# Patient Record
Sex: FEMALE | Race: WHITE | NOT HISPANIC OR LATINO | URBAN - NONMETROPOLITAN AREA
[De-identification: names, ages, dates, MRNs, and addresses within clinical notes are randomized per-mention and may not be internally consistent; named-entity substitution may affect disease eponyms.]

---

## 2020-02-26 ENCOUNTER — APPOINTMENT (RX ONLY)
Dept: URBAN - NONMETROPOLITAN AREA CLINIC 13 | Facility: CLINIC | Age: 40
Setting detail: DERMATOLOGY
End: 2020-02-26

## 2020-02-26 DIAGNOSIS — L56.4 POLYMORPHOUS LIGHT ERUPTION: ICD-10-CM | Status: INADEQUATELY CONTROLLED

## 2020-02-26 PROBLEM — L30.9 DERMATITIS, UNSPECIFIED: Status: ACTIVE | Noted: 2020-02-26

## 2020-02-26 PROCEDURE — ? SEPARATE AND IDENTIFIABLE DOCUMENTATION

## 2020-02-26 PROCEDURE — ? ADDITIONAL NOTES

## 2020-02-26 PROCEDURE — ? BIOPSY BY PUNCH METHOD

## 2020-02-26 PROCEDURE — ? PRESCRIPTION

## 2020-02-26 PROCEDURE — 11104 PUNCH BX SKIN SINGLE LESION: CPT

## 2020-02-26 PROCEDURE — 99213 OFFICE O/P EST LOW 20 MIN: CPT | Mod: 25

## 2020-02-26 ASSESSMENT — LOCATION DETAILED DESCRIPTION DERM
LOCATION DETAILED: MIDDLE STERNUM
LOCATION DETAILED: LEFT CENTRAL LATERAL NECK
LOCATION DETAILED: LEFT SUPERIOR CENTRAL MALAR CHEEK

## 2020-02-26 ASSESSMENT — LOCATION ZONE DERM
LOCATION ZONE: FACE
LOCATION ZONE: TRUNK
LOCATION ZONE: NECK

## 2020-02-26 ASSESSMENT — LOCATION SIMPLE DESCRIPTION DERM
LOCATION SIMPLE: CHEST
LOCATION SIMPLE: NECK
LOCATION SIMPLE: LEFT CHEEK

## 2020-02-26 NOTE — HPI: RASH
Is This A New Presentation, Or A Follow-Up?: Rash
Additional History: She reports no new medications, on Enbrel x 3 years for psoriatic arthritis

## 2020-02-26 NOTE — PROCEDURE: BIOPSY BY PUNCH METHOD
Detail Level: Detailed
Was A Bandage Applied: Yes
Punch Size In Mm: 3
Biopsy Type: H and E
Anesthesia Type: 1% lidocaine with epinephrine
Anesthesia Volume In Cc: 0.5
Additional Anesthesia Volume In Cc (Will Not Render If 0): 0
Hemostasis: None
Epidermal Sutures: 5-0 Fast Absorbing Gut
Wound Care: Petrolatum
Dressing: bandage
Patient Will Remove Sutures At Home?: No
Lab: [id number]
Path Notes (To The Dermatopathologist): Dr. Cervantes
Consent: Written consent was obtained and risks were reviewed including but not limited to scarring, infection, bleeding, scabbing, incomplete removal, nerve damage and allergy to anesthesia.
Post-Care Instructions: I reviewed with the patient in detail post-care instructions. Patient is to keep the biopsy site dry overnight, and then apply bacitracin twice daily until healed. Patient may apply hydrogen peroxide soaks to remove any crusting.
Home Suture Removal Text: Patient was provided a home suture removal kit and will remove their sutures at home.  If they have any questions or difficulties they will call the office.
Notification Instructions: Patient will be notified of biopsy results. However, patient instructed to call the office if not contacted within 2 weeks.
Billing Type: Third-Party Bill

## 2020-02-26 NOTE — PROCEDURE: ADDITIONAL NOTES
Detail Level: Simple
Additional Notes: -start prednisone 30mg QAM x 3 days,20mg QAM x 3 days,10mg QAM x 3 days\\n-follow up in 2 weeks\\n-aggressive sun protection reviewed

## 2020-04-27 ENCOUNTER — APPOINTMENT (RX ONLY)
Dept: URBAN - NONMETROPOLITAN AREA CLINIC 13 | Facility: CLINIC | Age: 40
Setting detail: DERMATOLOGY
End: 2020-04-27

## 2020-04-27 DIAGNOSIS — L30.9 DERMATITIS, UNSPECIFIED: ICD-10-CM | Status: INADEQUATELY CONTROLLED

## 2020-04-27 PROCEDURE — ? PATIENT SPECIFIC COUNSELING

## 2020-04-27 PROCEDURE — 99213 OFFICE O/P EST LOW 20 MIN: CPT

## 2020-04-27 PROCEDURE — ? PRESCRIPTION

## 2020-04-27 ASSESSMENT — LOCATION SIMPLE DESCRIPTION DERM: LOCATION SIMPLE: LEFT CHEEK

## 2020-04-27 ASSESSMENT — LOCATION ZONE DERM: LOCATION ZONE: FACE

## 2020-04-27 ASSESSMENT — LOCATION DETAILED DESCRIPTION DERM: LOCATION DETAILED: LEFT SUPERIOR LATERAL BUCCAL CHEEK

## 2020-04-27 NOTE — PROCEDURE: PATIENT SPECIFIC COUNSELING
Detail Level: Zone
Papular photodistributed eruption. DDx includes PMLE, eosinophilic folliculitis, solar or other urticaria, DHR (arthropod, unlikely), drug eruption (Enbrel, less likely given distribution and delayed onset). Biopsy without features of cutaneous lupus. \\n\\nPlan:\\n\\n-very aggressive sun protection measures discussed, recommended physical blockers and sun protective hats and clothing, sun avoidance\\n-recommended Allegra 180mg QAM and Zyrtec 10mg QPM, and Benadryl 25mg at bedtime. Can increase Allegra 180mg to 2 QAM and Zyrtec 10mg to 2 QPM if needed\\n-discussed prednisone taper 30mg QAM x 4 days, 20mg QAM x 4 days, 10mg QAM x 4 days \\n-if not well controlled, consider systemic medications, would repeat biopsy prior to starting oral medication\\n-pt is on Enbrel (x 4 years) for PsA, if refractory given other measures, consider stopping this x 2 months, would need to coordinate with Rheumatology. \\n-follow up in 1 month, call sooner if worsens

## 2020-06-15 ENCOUNTER — APPOINTMENT (RX ONLY)
Dept: URBAN - NONMETROPOLITAN AREA CLINIC 13 | Facility: CLINIC | Age: 40
Setting detail: DERMATOLOGY
End: 2020-06-15

## 2020-06-15 VITALS — WEIGHT: 165 LBS | HEIGHT: 70 IN

## 2020-06-15 DIAGNOSIS — L30.8 OTHER SPECIFIED DERMATITIS: ICD-10-CM

## 2020-06-15 PROCEDURE — ? PATIENT SPECIFIC COUNSELING

## 2020-06-15 PROCEDURE — ? COUNSELING

## 2020-06-15 PROCEDURE — 99213 OFFICE O/P EST LOW 20 MIN: CPT

## 2020-06-15 NOTE — PROCEDURE: PATIENT SPECIFIC COUNSELING
Detail Level: Zone
Plan:\\n\\n- Very aggressive sun protection measures discussed, recommended physical blockers and sunscreen\\n- Recommended taking 2 Allegra tablets daily in AM and 2 Zyrtec tablets daily in PM  \\n- Discussed adding doxepin for stronger antihistamine if rash fails to resolve with OTC’s\\n- Recommended discussing trial of discontinuing Enbrel with rheumatologist to rule-out drug eruption\\n- Patient will call to report improvement/status after increasing antihistamines

## 2020-09-21 ENCOUNTER — APPOINTMENT (RX ONLY)
Dept: URBAN - NONMETROPOLITAN AREA CLINIC 13 | Facility: CLINIC | Age: 40
Setting detail: DERMATOLOGY
End: 2020-09-21

## 2020-09-21 DIAGNOSIS — L56.4 POLYMORPHOUS LIGHT ERUPTION: ICD-10-CM

## 2020-09-21 PROBLEM — L30.9 DERMATITIS, UNSPECIFIED: Status: ACTIVE | Noted: 2020-09-21

## 2020-09-21 PROCEDURE — ? PATIENT SPECIFIC COUNSELING

## 2020-09-21 PROCEDURE — 99212 OFFICE O/P EST SF 10 MIN: CPT | Mod: 25

## 2020-09-21 PROCEDURE — 11104 PUNCH BX SKIN SINGLE LESION: CPT

## 2020-09-21 PROCEDURE — ? BIOPSY BY PUNCH METHOD

## 2020-09-21 PROCEDURE — ? SEPARATE AND IDENTIFIABLE DOCUMENTATION

## 2020-09-21 ASSESSMENT — LOCATION DETAILED DESCRIPTION DERM: LOCATION DETAILED: LEFT CENTRAL LATERAL NECK

## 2020-09-21 ASSESSMENT — LOCATION SIMPLE DESCRIPTION DERM: LOCATION SIMPLE: NECK

## 2020-09-21 ASSESSMENT — LOCATION ZONE DERM: LOCATION ZONE: NECK

## 2020-09-21 NOTE — PROCEDURE: PATIENT SPECIFIC COUNSELING
REFRACTORY PHOTODISTRIBUTED PAPULAR ERUPTION OVER THE NECK AND FACE. PMLE VS PHOTOALLERGIC DRUG ERUPTION VS SOLAR URTICARIA. LESIONS DEVELOP A FEW HOURS AFTER SUN EXPOSURE, AND LESIONS OF SOLAR URTICARIA ARE TYPICALLY IMMEDIATE. HOWEVER, HER RESPONSE TO ANTIHISTAMINES IS SUGGESTIVE OF AN URTICARIAL PROCESS. NO FEATURES OF LUPUS ON PRIOR BX. I DO NOT ASSOCIATE ENBREL WITH PHOTOALLERGY, HOWEVER THE PATIENT HAS NOTICED IMPROVEMENT SINCE DISCONTINUATION. TO HELP CLARIFY THE DIAGNOSIS, I RECOMMENDED A REPEAT BIOPSY TODAY, AND CONTINUE TO HOLD ENBREL FOR THE TIME BEING. \\n\\nPlan: \\n\\n- Recommended aggressive sun protection, avoid chemical sunscreens (occasionally oxybenzone can precipitate photoallergic contact dermatitis, althought no spongiosis on her prior bx). Additionally, physical blockers (zinc) are more effective for photosensitive dermatoses.\\n- Allegra BID, add Zyrtec if not controlled. If she is able to clear completely with this regimen, I would recommend continuing it as maintenance treatment. Again, response to these agents suggests solar urticaria rather than PMLE or photodrug, although onset of lesions after exposure atypical.\\n- briefly discussed the potential role of Plaquenil if needed, but if controlled with antihistamines then would not need this.\\n-further recs pending bx result
Detail Level: Zone

## 2020-09-21 NOTE — PROCEDURE: BIOPSY BY PUNCH METHOD
Detail Level: Detailed
Was A Bandage Applied: Yes
Punch Size In Mm: 4
Biopsy Type: H and E
Anesthesia Type: 1% lidocaine with epinephrine
Anesthesia Volume In Cc: 0.5
Additional Anesthesia Volume In Cc (Will Not Render If 0): 0
Hemostasis: None
Epidermal Sutures: 4-0 Nylon
Wound Care: Petrolatum
Dressing: bandage
Suture Removal: 14 days
Patient Will Remove Sutures At Home?: No
Lab: [id number]
Lab Facility: [id number]
Consent: Written consent was obtained and risks were reviewed including but not limited to scarring, infection, bleeding, scabbing, incomplete removal, nerve damage and allergy to anesthesia.
Post-Care Instructions: I reviewed with the patient in detail post-care instructions. Patient is to keep the biopsy site dry overnight, and then apply bacitracin twice daily until healed. Patient may apply hydrogen peroxide soaks to remove any crusting.
Home Suture Removal Text: Patient was provided a home suture removal kit and will remove their sutures at home.  If they have any questions or difficulties they will call the office.
Notification Instructions: Patient will be notified of biopsy results. However, patient instructed to call the office if not contacted within 2 weeks.
Billing Type: Third-Party Bill
Information: Selecting Yes will display possible errors in your note based on the variables you have selected. This validation is only offered as a suggestion for you. PLEASE NOTE THAT THE VALIDATION TEXT WILL BE REMOVED WHEN YOU FINALIZE YOUR NOTE. IF YOU WANT TO FAX A PRELIMINARY NOTE YOU WILL NEED TO TOGGLE THIS TO 'NO' IF YOU DO NOT WANT IT IN YOUR FAXED NOTE.